# Patient Record
Sex: MALE | Race: WHITE | URBAN - METROPOLITAN AREA
[De-identification: names, ages, dates, MRNs, and addresses within clinical notes are randomized per-mention and may not be internally consistent; named-entity substitution may affect disease eponyms.]

---

## 2022-02-07 ENCOUNTER — OFFICE VISIT (OUTPATIENT)
Dept: URBAN - METROPOLITAN AREA CLINIC 82 | Facility: CLINIC | Age: 67
End: 2022-02-07
Payer: MEDICARE

## 2022-02-07 DIAGNOSIS — T15.12XA FOREIGN BODY IN CONJUNCTIVAL SAC OF LT EYE, INITIAL ENCOUNTER: Primary | ICD-10-CM

## 2022-02-07 PROCEDURE — 65205 REMOVE FOREIGN BODY FROM EYE: CPT | Performed by: OPTOMETRIST

## 2022-02-07 ASSESSMENT — INTRAOCULAR PRESSURE
OS: 11
OD: 12

## 2022-02-07 NOTE — IMPRESSION/PLAN
Impression: Foreign body in conjunctival sac of lt eye, initial encounter: T15.12XA. Plan: Educated pt on exam findings. Pt gave verbal permission to remove conjunctival FB. A CTA was used and patient tolerated well. FB successfully removed. Continue Tobramyacin QID OS as directed from urgent care as cover for infection. PT already scheduled for CE next week, follow up with symptoms at that time. Educated pt gritty sensation should continue to improve, if symptoms or vision worsen sooner, RTC asap.

## 2022-02-15 ENCOUNTER — OFFICE VISIT (OUTPATIENT)
Dept: URBAN - METROPOLITAN AREA CLINIC 82 | Facility: CLINIC | Age: 67
End: 2022-02-15
Payer: MEDICARE

## 2022-02-15 DIAGNOSIS — H53.2 DIPLOPIA: ICD-10-CM

## 2022-02-15 DIAGNOSIS — H25.13 AGE-RELATED NUCLEAR CATARACT, BILATERAL: Primary | ICD-10-CM

## 2022-02-15 DIAGNOSIS — H52.4 PRESBYOPIA: ICD-10-CM

## 2022-02-15 PROCEDURE — 92310 CONTACT LENS FITTING OU: CPT | Performed by: OPTOMETRIST

## 2022-02-15 PROCEDURE — 99213 OFFICE O/P EST LOW 20 MIN: CPT | Performed by: OPTOMETRIST

## 2022-02-15 ASSESSMENT — INTRAOCULAR PRESSURE
OD: 11
OS: 12

## 2022-02-15 ASSESSMENT — VISUAL ACUITY
OD: 20/20
OS: 20/20

## 2022-02-15 ASSESSMENT — KERATOMETRY
OD: 2197.25
OS: 23.25

## 2022-02-15 NOTE — IMPRESSION/PLAN
Impression: Diplopia: H53.2. Plan: Suspect likely related to monovision and possible uncorrected astigmatism. Pt notes no diplopia in glasses, only CLs. Attempt MF CL fit to assist with binocular vision.

## 2022-02-15 NOTE — IMPRESSION/PLAN
Impression: Presbyopia: H52.4. Plan: Educated pt on exam findings. Updated and finalized SRx. Educated pt on 1-2 week adaptation period with updated SRx. Educated pt on proper CL hygiene, replacement of lenses, avoiding sleeping and swimming in lenses. Trials ordered today, RTC 1 week for CL check. Pt interested in trialing MF CLs instead of monovision set up. After refraction pt no longer experienced diplopia. Suspect diplopia related to uncorrected astigmatism and monovision set up. Discussed options of correcting astigmatism in CLs but no daily options available in toric MF. Continue with spherical equivalent at this time.

## 2022-03-09 ENCOUNTER — OFFICE VISIT (OUTPATIENT)
Dept: URBAN - METROPOLITAN AREA CLINIC 82 | Facility: CLINIC | Age: 67
End: 2022-03-09
Payer: MEDICARE

## 2022-03-09 DIAGNOSIS — H35.3111 NEXDTVE AGE-RELATED MCLR DEGN, RIGHT EYE, EARLY DRY STAGE: Primary | ICD-10-CM

## 2022-03-09 PROCEDURE — 99213 OFFICE O/P EST LOW 20 MIN: CPT | Performed by: OPTOMETRIST

## 2022-03-09 ASSESSMENT — INTRAOCULAR PRESSURE
OD: 14
OS: 12

## 2022-03-09 NOTE — IMPRESSION/PLAN
Impression: Nexdtve age-related mclr degn, right eye, early dry stage: H35.3111. Plan: Follow up with Nathanael Atkins as scheduled.

## 2022-03-10 ENCOUNTER — OFFICE VISIT (OUTPATIENT)
Dept: URBAN - METROPOLITAN AREA CLINIC 82 | Facility: CLINIC | Age: 67
End: 2022-03-10

## 2022-03-10 PROCEDURE — 92310 CONTACT LENS FITTING OU: CPT | Performed by: OPTOMETRIST

## 2022-03-10 ASSESSMENT — INTRAOCULAR PRESSURE
OS: 14
OD: 11

## 2022-03-10 NOTE — IMPRESSION/PLAN
Impression: Presbyopia: H52.4. Plan: Dispensed new trials today with modification ot OS lens to improve near vision with attempts to avoid compromising distance vision. Pt to trial lenses. Educated pt if he is still struggling with near clarity, recommended RTC to trial Biofinity MF lenses. Educated pt on proper CL hygiene, replacement of lenses, avoiding sleeping and swimming in lenses. Trials dispensed today, fit looks good today, okay for patient to call to finalize CLs.

## 2023-05-11 ENCOUNTER — OFFICE VISIT (OUTPATIENT)
Dept: URBAN - METROPOLITAN AREA CLINIC 85 | Facility: CLINIC | Age: 68
End: 2023-05-11
Payer: MEDICARE

## 2023-05-11 DIAGNOSIS — T15.12XA FOREIGN BODY IN CONJUNCTIVAL SAC OF LT EYE, INITIAL ENCOUNTER: Primary | ICD-10-CM

## 2023-05-11 PROCEDURE — 92004 COMPRE OPH EXAM NEW PT 1/>: CPT | Performed by: OPHTHALMOLOGY

## 2023-05-11 ASSESSMENT — INTRAOCULAR PRESSURE
OD: 16
OS: 14

## 2023-05-11 NOTE — IMPRESSION/PLAN
Impression: Foreign body in conjunctival sac of lt eye, initial encounter: T15.12xA. Plan: Small foreign body removed from conjunctiva, nasally, with sterile tippled applicator. Small superficial abrasion,  nasally. Patient directed to use lubrication drops QID OU. RTC as scheduled with Dr. Cruz Hillman or  ASAP if there should be any decrease in vision, pain, or any worsening of condition.